# Patient Record
Sex: MALE | Race: WHITE | NOT HISPANIC OR LATINO | Employment: UNEMPLOYED | URBAN - METROPOLITAN AREA
[De-identification: names, ages, dates, MRNs, and addresses within clinical notes are randomized per-mention and may not be internally consistent; named-entity substitution may affect disease eponyms.]

---
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
## 2024-03-12 ENCOUNTER — TELEPHONE (OUTPATIENT)
Dept: FAMILY MEDICINE | Facility: CLINIC | Age: 19
End: 2024-03-12
Payer: COMMERCIAL

## 2024-03-12 NOTE — TELEPHONE ENCOUNTER
"Pt's mother called concerned because [facility] let them know that [facility] was \"giving pushback\" regarding the referral. Advised no C2C on file. Did advise that note would be placed requesting the referral.     Karyn Rodriguez     "

## 2024-03-12 NOTE — TELEPHONE ENCOUNTER
Received a call from Patric from [facility] [phone].    They are doing left shoulder surgery on 4/2 at [facility].  They were told he needs a blue [facility] referral for having the surgery at the [facility].  His insurance is an HMO.        A referral needs to go to [facility].  He needs a referral for doing it at [facility].  This is for the surgery center, not the [facility] provider.      Patric was wondering about the Avail[facility] site for referrals.  He will not have coverage if he does not get a referral.      Asked if they could do it at another site that is covered.  She said then he would need another doctor.      Advised I will forward this to Henrietta Tipton who he is seeing for the pre-op.  She said the referral has to come from his primary care doctor.      Patric said that Henrietta can call her with questions.

## 2024-03-13 NOTE — TELEPHONE ENCOUNTER
As we are finding with our current situation when a provider leaves a location there are to be covering providers to handle items for patients until they are seen. This pt is scheduled to see Henrietta Tipton on 3/20/2024 for a Pre-Op but with my previous note, insurance is requiring PCP to complete. Last completed visit was with Reyna Sosa therefore routing back to [facility] to have covering provider complete.     Karyn Rodriguez

## 2024-03-14 NOTE — TELEPHONE ENCOUNTER
Please call Patric at [facility]. I don't know how to put in a referral for a surgery center. Also, he no longer has a primary care provider as she doesn't work here anymore. What exactly is needed?     Gael Kc PA-C on 3/14/2024 at 10:32 AM (covering for Reyna Sosa)

## 2024-03-14 NOTE — TELEPHONE ENCOUNTER
I called Patric from [facility] @[phone]. She stats pt need referral from a PCP. Unfortunately pt doesn't have a PCP here at , also pt wants to est care at the  clinic. Perhaps pt can have a phone visit before Pre Op and est care? Will forward to the  clinic.    Thank you,    Areli Rivera [facility]

## 2024-03-14 NOTE — TELEPHONE ENCOUNTER
Pt is scheduled to see VO for a Pr-op visit on: 03/20/24    VO will address the concern below at the time of the Pt's appt.     Reyna Rodriguez

## 2024-03-20 ENCOUNTER — OFFICE VISIT (OUTPATIENT)
Dept: FAMILY MEDICINE | Facility: CLINIC | Age: 19
End: 2024-03-20
Payer: COMMERCIAL

## 2024-03-20 VITALS
DIASTOLIC BLOOD PRESSURE: 81 MMHG | TEMPERATURE: 98 F | WEIGHT: 164.5 LBS | RESPIRATION RATE: 25 BRPM | OXYGEN SATURATION: 98 % | HEART RATE: 68 BPM | SYSTOLIC BLOOD PRESSURE: 128 MMHG | BODY MASS INDEX: 23.94 KG/M2

## 2024-03-20 DIAGNOSIS — Z01.818 PREOP GENERAL PHYSICAL EXAM: Primary | ICD-10-CM

## 2024-03-20 DIAGNOSIS — M25.312 SHOULDER INSTABILITY, LEFT: ICD-10-CM

## 2024-03-20 PROCEDURE — 99213 OFFICE O/P EST LOW 20 MIN: CPT | Performed by: NURSE PRACTITIONER

## 2024-03-20 PROCEDURE — 36415 COLL VENOUS BLD VENIPUNCTURE: CPT | Performed by: NURSE PRACTITIONER

## 2024-03-20 PROCEDURE — 85018 HEMOGLOBIN: CPT | Performed by: NURSE PRACTITIONER

## 2024-03-20 ASSESSMENT — PAIN SCALES - GENERAL: PAINLEVEL: NO PAIN (0)

## 2024-03-20 NOTE — PATIENT INSTRUCTIONS
Preparing for Your Surgery  Getting started  A nurse will call you to review your health history and instructions. They will give you an arrival time based on your scheduled surgery time. Please be ready to share:  Your doctor's clinic name and phone number  Your medical, surgical, and anesthesia history  A list of allergies and sensitivities  A list of medicines, including herbal treatments and over-the-counter drugs  Whether the patient has a legal guardian (ask how to send us the papers in advance)  Please tell us if you're pregnant--or if there's any chance you might be pregnant. Some surgeries may injure a fetus (unborn baby), so they require a pregnancy test. Surgeries that are safe for a fetus don't always need a test, and you can choose whether to have one.   If you have a child who's having surgery, please ask for a copy of Preparing for Your Child's Surgery.    Preparing for surgery  Within 10 to 30 days of surgery: Have a pre-op exam (sometimes called an H&P, or History and Physical). This can be done at a clinic or pre-operative center.  If you're having a , you may not need this exam. Talk to your care team.  At your pre-op exam, talk to your care team about all medicines you take. If you need to stop any medicines before surgery, ask when to start taking them again.  We do this for your safety. Many medicines can make you bleed too much during surgery. Some change how well surgery (anesthesia) drugs work.  Call your insurance company to let them know you're having surgery. (If you don't have insurance, call [phone].)  Call your clinic if there's any change in your health. This includes signs of a cold or flu (sore throat, runny nose, cough, rash, fever). It also includes a scrape or scratch near the surgery site.  If you have questions on the day of surgery, call your hospital or surgery center.  Eating and drinking guidelines  For your safety: Unless your surgeon tells you otherwise,  follow the guidelines below.  Eat and drink as usual until 8 hours before you arrive for surgery. After that, no food or milk.  Drink clear liquids until 2 hours before you arrive. These are liquids you can see through, like water, Gatorade, and Propel Water. They also include plain black coffee and tea (no cream or milk), candy, and breath mints. You can spit out gum when you arrive.  If you drink alcohol: Stop drinking it the night before surgery.  If your care team tells you to take medicine on the morning of surgery, it's okay to take it with a sip of water.  Preventing infection  Shower or bathe the night before and morning of your surgery. Follow the instructions your clinic gave you. (If no instructions, use regular soap.)  Don't shave or clip hair near your surgery site. We'll remove the hair if needed.  Don't smoke or vape the morning of surgery. You may chew nicotine gum up to 2 hours before surgery. A nicotine patch is okay.  Note: Some surgeries require you to completely quit smoking and nicotine. Check with your surgeon.  Your care team will make every effort to keep you safe from infection. We will:  Clean our hands often with soap and water (or an alcohol-based hand rub).  Clean the skin at your surgery site with a special soap that kills germs.  Give you a special gown to keep you warm. (Cold raises the risk of infection.)  Wear special hair covers, masks, gowns and gloves during surgery.  Give antibiotic medicine, if prescribed. Not all surgeries need antibiotics.  What to bring on the day of surgery  Photo ID and insurance card  Copy of your health care directive, if you have one  Glasses and hearing aids (bring cases)  You can't wear contacts during surgery  Inhaler and eye drops, if you use them (tell us about these when you arrive)  CPAP machine or breathing device, if you use them  A few personal items, if spending the night  If you have . . .  A pacemaker, ICD (cardiac defibrillator) or other  implant: Bring the ID card.  An implanted stimulator: Bring the remote control.  A legal guardian: Bring a copy of the certified (court-stamped) guardianship papers.  Please remove any jewelry, including body piercings. Leave jewelry and other valuables at home.  If you're going home the day of surgery  You must have a responsible adult drive you home. They should stay with you overnight as well.  If you don't have someone to stay with you, and you aren't safe to go home alone, we may keep you overnight. Insurance often won't pay for this.  After surgery  If it's hard to control your pain or you need more pain medicine, please call your surgeon's office.  Questions?   If you have any questions for your care team, list them here: _________________________________________________________________________________________________________________________________________________________________________ ____________________________________ ____________________________________ ____________________________________  For informational purposes only. Not to replace the advice of your health care provider. Copyright   2003, 2019 [employer]. All rights reserved. Clinically reviewed by Basia Maki MD. SMARTworks 757797 - REV 12/22.    How to Take Your Medication Before Surgery  - You are currently not on any medications, please don't start supplements or use anti-inflammatories  before surgery    Preparing for Your Surgery  Getting started  A nurse will call you to review your health history and instructions. They will give you an arrival time based on your scheduled surgery time. Please be ready to share:  Your doctor's clinic name and phone number  Your medical, surgical, and anesthesia history  A list of allergies and sensitivities  A list of medicines, including herbal treatments and over-the-counter drugs  Whether the patient has a legal guardian (ask how to send us the papers in advance)  Please tell us if  you're pregnant--or if there's any chance you might be pregnant. Some surgeries may injure a fetus (unborn baby), so they require a pregnancy test. Surgeries that are safe for a fetus don't always need a test, and you can choose whether to have one.   If you have a child who's having surgery, please ask for a copy of Preparing for Your Child's Surgery.    Preparing for surgery  Within 10 to 30 days of surgery: Have a pre-op exam (sometimes called an H&P, or History and Physical). This can be done at a clinic or pre-operative center.  If you're having a , you may not need this exam. Talk to your care team.  At your pre-op exam, talk to your care team about all medicines you take. If you need to stop any medicines before surgery, ask when to start taking them again.  We do this for your safety. Many medicines can make you bleed too much during surgery. Some change how well surgery (anesthesia) drugs work.  Call your insurance company to let them know you're having surgery. (If you don't have insurance, call [phone].)  Call your clinic if there's any change in your health. This includes signs of a cold or flu (sore throat, runny nose, cough, rash, fever). It also includes a scrape or scratch near the surgery site.  If you have questions on the day of surgery, call your hospital or surgery center.  Eating and drinking guidelines  For your safety: Unless your surgeon tells you otherwise, follow the guidelines below.  Eat and drink as usual until 8 hours before you arrive for surgery. After that, no food or milk.  Drink clear liquids until 2 hours before you arrive. These are liquids you can see through, like water, Gatorade, and Propel Water. They also include plain black coffee and tea (no cream or milk), candy, and breath mints. You can spit out gum when you arrive.  If you drink alcohol: Stop drinking it the night before surgery.  If your care team tells you to take medicine on the morning of surgery,  it's okay to take it with a sip of water.  Preventing infection  Shower or bathe the night before and morning of your surgery. Follow the instructions your clinic gave you. (If no instructions, use regular soap.)  Don't shave or clip hair near your surgery site. We'll remove the hair if needed.  Don't smoke or vape the morning of surgery. You may chew nicotine gum up to 2 hours before surgery. A nicotine patch is okay.  Note: Some surgeries require you to completely quit smoking and nicotine. Check with your surgeon.  Your care team will make every effort to keep you safe from infection. We will:  Clean our hands often with soap and water (or an alcohol-based hand rub).  Clean the skin at your surgery site with a special soap that kills germs.  Give you a special gown to keep you warm. (Cold raises the risk of infection.)  Wear special hair covers, masks, gowns and gloves during surgery.  Give antibiotic medicine, if prescribed. Not all surgeries need antibiotics.  What to bring on the day of surgery  Photo ID and insurance card  Copy of your health care directive, if you have one  Glasses and hearing aids (bring cases)  You can't wear contacts during surgery  Inhaler and eye drops, if you use them (tell us about these when you arrive)  CPAP machine or breathing device, if you use them  A few personal items, if spending the night  If you have . . .  A pacemaker, ICD (cardiac defibrillator) or other implant: Bring the ID card.  An implanted stimulator: Bring the remote control.  A legal guardian: Bring a copy of the certified (court-stamped) guardianship papers.  Please remove any jewelry, including body piercings. Leave jewelry and other valuables at home.  If you're going home the day of surgery  You must have a responsible adult drive you home. They should stay with you overnight as well.  If you don't have someone to stay with you, and you aren't safe to go home alone, we may keep you overnight. Insurance often  won't pay for this.  After surgery  If it's hard to control your pain or you need more pain medicine, please call your surgeon's office.  Questions?   If you have any questions for your care team, list them here: _________________________________________________________________________________________________________________________________________________________________________ ____________________________________ ____________________________________ ____________________________________  For informational purposes only. Not to replace the advice of your health care provider. Copyright   2003, 2019 [facility]. All rights reserved. Clinically reviewed by Basia Maki MD. SMARTworks 337557 - REV 12/22.    How to Take Your Medication Before Surgery  On no medications.  Do not start OTC supplements or use NSAIDS before surgery

## 2024-03-20 NOTE — PROGRESS NOTES
Preoperative Evaluation  [facility]  [address] [address] [address]  Phone: [phone]  Primary Provider: Pediatrics Glenn Zavala  Pre-op Performing Provider: RUKHSANA MARVIN 20, 2024       Sd is a 18 year old, presenting for the following:  Pre-Op Exam        3/20/2024     8:37 AM   Additional Questions   Roomed by Marcelina AVILEZ MA   Accompanied by Chucky Mccarthy         3/20/2024     8:37 AM   Patient Reported Additional Medications   Patient reports taking the following new medications None     Surgical Information  Surgery/Procedure: Left shoulder repair  Surgery Location: [facility]  Surgeon: Avery Mckeon  Surgery Date: 4/2/2024  Time of Surgery: TBD  Where patient plans to recover: At home with family  Fax number for surgical facility: fax to [phone]     Assessment & Plan     The proposed surgical procedure is considered INTERMEDIATE risk.      ICD-10-CM    1. Preop general physical exam  Z01.818 Hemoglobin     Hemoglobin      2. Shoulder instability, left  M25.312 Orthopedic  Referral              - No identified additional risk factors other than previously addressed    Antiplatelet or Anticoagulation Medication Instructions   - Patient is on no antiplatelet or anticoagulation medications.    Additional Medication Instructions  Patient is on no additional chronic medications    Recommendation  APPROVAL GIVEN to proceed with proposed procedure, without further diagnostic evaluation.          Subjective       HPI related to upcoming procedure:   Shoulder instability, left  Experiences dislocation with activities of daily living.  Surgical treatment is planned.          3/20/2024     8:17 AM   Preop Questions   1. Have you ever had a heart attack or stroke? No   2. Have you ever had surgery on your heart or blood vessels, such as a stent placement, a coronary artery bypass, or surgery on an artery in your head, neck, heart, or legs? No   3. Do you  have chest pain with activity? No   4. Do you have a history of  heart failure? No   5. Do you currently have a cold, bronchitis or symptoms of other infection? No   6. Do you have a cough, shortness of breath, or wheezing? No   7. Do you or anyone in your family have previous history of blood clots? No   8. Do you or does anyone in your family have a serious bleeding problem such as prolonged bleeding following surgeries or cuts? No   9. Have you ever had problems with anemia or been told to take iron pills? No   10. Have you had any abnormal blood loss such as black, tarry or bloody stools? No   11. Have you ever had a blood transfusion? No   12. Are you willing to have a blood transfusion if it is medically needed before, during, or after your surgery? Yes   13. Have you or any of your relatives ever had problems with anesthesia? No   14. Do you have sleep apnea, excessive snoring or daytime drowsiness? No   15. Do you have any artifical heart valves or other implanted medical devices like a pacemaker, defibrillator, or continuous glucose monitor? No   16. Do you have artificial joints? No   17. Are you allergic to latex? No       Health Care Directive  Patient does not have a Health Care Directive or Living Will: Discussed advance care planning with patient; however, patient declined at this time.    Preoperative Review of    reviewed - previous opioids for surgical procedures as expected          Patient Active Problem List    Diagnosis Date Noted    Shoulder instability, left 03/20/2024     Priority: Medium    Mandible fracture (H) 01/13/2022     Priority: Medium      No past medical history on file.  Past Surgical History:   Procedure Laterality Date    OPEN REDUCTION INTERNAL FIXATION MANDIBLE Left 1/13/2022    Procedure: OPEN REDUCTION INTERNAL FIXATION, FRACTURE, LEFT    MANDIBLE;  Surgeon: Dago Elizondo DDS;  Location:  OR    ORTHOPEDIC SURGERY      broken finger, pin placed    TONSILLECTOMY,  "ADENOIDECTOMY, COMBINED       No current outpatient medications on file.       No Known Allergies     Social History     Tobacco Use    Smoking status: Never     Passive exposure: Never    Smokeless tobacco: Never   Substance Use Topics    Alcohol use: No     Alcohol/week: 0.0 standard drinks of alcohol       History   Drug Use No         Review of Systems    Review of Systems  CONSTITUTIONAL: NEGATIVE for fever, chills, change in weight  INTEGUMENTARY/SKIN: NEGATIVE for worrisome rashes, moles or lesions  EYES: NEGATIVE for vision changes or irritation  ENT/MOUTH: NEGATIVE for ear, mouth and throat problems  RESP: NEGATIVE for significant cough or SOB  CV: NEGATIVE for chest pain, palpitations or peripheral edema  GI: NEGATIVE for nausea, abdominal pain, heartburn, or change in bowel habits  : NEGATIVE for frequency, dysuria, or hematuria  NEURO: NEGATIVE for weakness, dizziness or paresthesias  ENDOCRINE: NEGATIVE for temperature intolerance, skin/hair changes  HEME: NEGATIVE for bleeding problems  PSYCHIATRIC: NEGATIVE for changes in mood or affect    Objective    /81 (BP Location: Right arm, Patient Position: Sitting, Cuff Size: Adult Regular)   Pulse 68   Temp 98  F (36.7  C) (Oral)   Resp 25   Wt 74.6 kg (164 lb 8 oz)   SpO2 98%   BMI 23.94 kg/m     Estimated body mass index is 23.94 kg/m  as calculated from the following:    Height as of 5/2/23: 1.765 m (5' 9.5\").    Weight as of this encounter: 74.6 kg (164 lb 8 oz).  Physical Exam  GENERAL: alert and no distress  EYES: Eyes grossly normal to inspection, PERRL and conjunctivae and sclerae normal  HENT: ear canals and TM's normal, nose and mouth without ulcers or lesions  NECK: no adenopathy, no asymmetry, masses, or scars  RESP: lungs clear to auscultation - no rales, rhonchi or wheezes  CV: regular rate and rhythm, normal S1 S2, no S3 or S4, no murmur, click or rub, no peripheral edema  ABDOMEN: soft, nontender, no hepatosplenomegaly, no " masses and bowel sounds normal  MS: no gross musculoskeletal defects noted, no edema  SKIN: no suspicious lesions or rashes  NEURO: Normal strength and tone, mentation intact and speech normal  PSYCH: mentation appears normal, affect normal/bright    Recent Labs   Lab Test 05/02/23  0823   HGB 14.0         POTASSIUM 4.3   CR 1.07        Diagnostics  Hemoglobin   Date Value Ref Range Status   03/20/2024 14.6 13.3 - 17.7 g/dL Final   08/06/2016 12.1 11.7 - 15.7 g/dL Final   ]  No EKG required, no history of coronary heart disease, significant arrhythmia, peripheral arterial disease or other structural heart disease.    Revised Cardiac Risk Index (RCRI)  The patient has the following serious cardiovascular risks for perioperative complications:   - No serious cardiac risks = 0 points     RCRI Interpretation: 0 points: Class I (very low risk - 0.4% complication rate)         Signed Electronically by: YASMANI Marrufo CNP  Copy of this evaluation report is provided to requesting physician.

## 2024-03-21 LAB — HGB BLD-MCNC: 14.6 G/DL (ref 13.3–17.7)

## 2024-03-22 ENCOUNTER — OFFICE VISIT (OUTPATIENT)
Dept: URGENT CARE | Facility: URGENT CARE | Age: 19
End: 2024-03-22
Payer: COMMERCIAL

## 2024-03-22 ENCOUNTER — ANCILLARY PROCEDURE (OUTPATIENT)
Dept: GENERAL RADIOLOGY | Facility: CLINIC | Age: 19
End: 2024-03-22
Attending: FAMILY MEDICINE
Payer: COMMERCIAL

## 2024-03-22 VITALS
TEMPERATURE: 97.6 F | SYSTOLIC BLOOD PRESSURE: 133 MMHG | RESPIRATION RATE: 16 BRPM | HEART RATE: 65 BPM | DIASTOLIC BLOOD PRESSURE: 81 MMHG | OXYGEN SATURATION: 99 %

## 2024-03-22 DIAGNOSIS — S90.411A ABRASION, RIGHT GREAT TOE, INITIAL ENCOUNTER: ICD-10-CM

## 2024-03-22 DIAGNOSIS — M79.674 PAIN OF TOE OF RIGHT FOOT: Primary | ICD-10-CM

## 2024-03-22 DIAGNOSIS — M79.674 PAIN OF TOE OF RIGHT FOOT: ICD-10-CM

## 2024-03-22 PROCEDURE — 73660 X-RAY EXAM OF TOE(S): CPT | Mod: TC | Performed by: RADIOLOGY

## 2024-03-22 PROCEDURE — 99213 OFFICE O/P EST LOW 20 MIN: CPT | Performed by: FAMILY MEDICINE

## 2024-03-22 NOTE — PROGRESS NOTES
SUBJECTIVE:  Chief Complaint   Patient presents with    Toe Injury     Yesterday, right big toe and 2nd toe, bottom of chair smashed into toes, brusied, painful to walk     Juan Montana is a 19 year old male presents with a chief complaint of traumatic pain and bruising at the right big toe and right second toe.  The injury occurred one day ago.   The injury happened while at a friend's house. How: The patient was sitting on a chair and leaned back.  The bottom of a chair hit the right big toe and the right second toe.  . .  Pain exacerbated by walking, wearing closed-toe shoes, touching the painful areas.  Relieved by keeping off the right foot.  .  He treated it initially with Band-Aid, ice. . This is the first time this type of injury has occurred to this patient.     Past Medical History:    No major medical problems.     No current outpatient medications on file.     Social History     Tobacco Use    Smoking status: Never     Passive exposure: Never    Smokeless tobacco: Never   Substance Use Topics    Alcohol use: No     Alcohol/week: 0.0 standard drinks of alcohol       ROS:  CONSTITUTIONAL:negative for fevers.    MUSCULOSKELETAL:  positive for pain at the right great toe and right second toe.      EXAM:   /81   Pulse 65   Temp 97.6  F (36.4  C)   Resp 16   SpO2 99%   Gen: healthy,alert,no distress  Extremity:   Right great toe:  there is a linear-shaped horizontal abrasion on the dorsal surface of the distal phalanx area (proximal to the nail).  No active bleeding is present.  No subungual hematomas.  There is some tenderness with palpation over the right distal phalanx region    Right second toe has a small subungual hematoma present.  There is no active bleeding.  There is pain with palpation over the distal phalanx area.      X-RAY was done.  I viewed all X-ray images during this clinic encounter.    X-rays of the toes of the right foot showed possible radiolucency at the distal aspect  of the distal phalanx of the right second toe (best seen on the lateral view).  The right great toe had no fractures seen on the X-rays.  Note:  The radiology report did not find a fracture on any of the toes of the right foot.      ASSESSMENT:   Pain at the right great toe and right second toe. The radiology report found no fractures at the affected toes.      Abrasion on the right great toe.      PLAN:  For the abrasion:  Continue applying Bacitracin ointment and keep covered.  Change the dressing once daily until a scab forms.    Follow up if any spreading redness, if lots of pus or if fevers appear.      For the toe pain and for the possible right second toe distal phalanx fracture:  D a isaac Splint of the right second toe to the right third toe for the next 2 weeks.    Place ice onto the painful areas of the toes.    Tylenol, Ibuprofen for the pain.        Jaylan Cohen MD

## 2024-03-22 NOTE — PATIENT INSTRUCTIONS
Continue applying Bacitracin onto the scraped area of the right big toe once a day.  Cover that area with a Band-Aid.  Change the Band-Aid once a day.  You can stop the Bacitracin and Band-Aid once a scab forms.      Place ice onto the painful areas of the toes for 15 minutes at a time, every 2 hours while awake.      Follow up if not better in 2 weeks.

## 2024-11-10 ENCOUNTER — HEALTH MAINTENANCE LETTER (OUTPATIENT)
Age: 19
End: 2024-11-10

## 2024-12-23 ENCOUNTER — OFFICE VISIT (OUTPATIENT)
Dept: URGENT CARE | Facility: URGENT CARE | Age: 19
End: 2024-12-23
Payer: COMMERCIAL

## 2024-12-23 VITALS
WEIGHT: 167 LBS | DIASTOLIC BLOOD PRESSURE: 83 MMHG | BODY MASS INDEX: 24.31 KG/M2 | OXYGEN SATURATION: 98 % | TEMPERATURE: 96.9 F | RESPIRATION RATE: 18 BRPM | HEART RATE: 69 BPM | SYSTOLIC BLOOD PRESSURE: 129 MMHG

## 2024-12-23 DIAGNOSIS — J22 LOWER RESP. TRACT INFECTION: Primary | ICD-10-CM

## 2024-12-23 PROCEDURE — 99213 OFFICE O/P EST LOW 20 MIN: CPT | Performed by: PHYSICIAN ASSISTANT

## 2024-12-23 RX ORDER — PREDNISONE 20 MG/1
40 TABLET ORAL DAILY
Qty: 10 TABLET | Refills: 0 | Status: SHIPPED | OUTPATIENT
Start: 2024-12-23 | End: 2024-12-28

## 2024-12-23 RX ORDER — PSEUDOEPHED/ACETAMINOPH/DIPHEN 30MG-500MG
500-1000 TABLET ORAL EVERY 6 HOURS PRN
COMMUNITY
Start: 2024-04-01

## 2024-12-23 NOTE — PROGRESS NOTES
SUBJECTIVE:  Juan Montana is a 19 year old male who comes in with approximately 2-week history of cough.  Initially cough was dry but becoming more productive.  He did not feel short of breath at rest but has noticed that at hockey he is feeling more winded.  Has been using his albuterol inhaler.  Did have asthma more as a child and seems to be outgrowing it.  Cold air and activity seem to be affecting the cough.  Does have some mild sinus congestion along with pain for the last 4 to 5 days.  No fevers have been noted.  Has been using over-the-counter med for symptomatic relief.  He is otherwise at baseline health.      No past medical history on file.  Patient Active Problem List   Diagnosis    Mandible fracture (H)    Shoulder instability, left     Current Outpatient Medications   Medication Sig Dispense Refill    acetaminophen (TYLENOL) 500 MG tablet Take 500-1,000 mg by mouth every 6 hours as needed.       No current facility-administered medications for this visit.     Social History     Socioeconomic History    Marital status: Single     Spouse name: Not on file    Number of children: Not on file    Years of education: Not on file    Highest education level: Not on file   Occupational History    Not on file   Tobacco Use    Smoking status: Never     Passive exposure: Never    Smokeless tobacco: Never   Vaping Use    Vaping status: Never Used   Substance and Sexual Activity    Alcohol use: No     Alcohol/week: 0.0 standard drinks of alcohol    Drug use: No    Sexual activity: Never   Other Topics Concern    Not on file   Social History Narrative    Not on file     Social Drivers of Health     Financial Resource Strain: Low Risk  (5/2/2023)    Overall Financial Resource Strain (CARDIA)     Difficulty of Paying Living Expenses: Not hard at all   Food Insecurity: No Food Insecurity (5/2/2023)    Hunger Vital Sign     Worried About Running Out of Food in the Last Year: Never true     Ran Out of Food in  the Last Year: Never true   Transportation Needs: No Transportation Needs (5/2/2023)    PRAPARE - Transportation     Lack of Transportation (Medical): No     Lack of Transportation (Non-Medical): No   Physical Activity: Sufficiently Active (5/2/2023)    Exercise Vital Sign     Days of Exercise per Week: 7 days     Minutes of Exercise per Session: 120 min   Stress: No Stress Concern Present (5/2/2023)    [employer] [place] [employer] - Occupational Stress Questionnaire     Feeling of Stress : Only a little   Social Connections: Unknown (5/2/2023)    Social Connection and Isolation Panel [NHANES]     Frequency of Communication with Friends and Family: Three times a week     Frequency of Social Gatherings with Friends and Family: More than three times a week     Attends Yarsanism Services: Never     Active Member of Clubs or Organizations: Yes     Attends Club or Organization Meetings: Not on file     Marital Status: Patient declined   Interpersonal Safety: Not on file   Housing Stability: High Risk (5/2/2023)    Housing Stability Vital Sign     Unable to Pay for Housing in the Last Year: No     Number of Places Lived in the Last Year: 3     Unstable Housing in the Last Year: No     ROS negative other than stated above    Exam:  GENERAL APPEARANCE: healthy, alert and no distress  EYES: EOMI,  PERRL  HENT: ear canals and TM's normal and nose and mouth without ulcers or lesions  RESP: Patient with coarse rhonchi scattered throughout.  Tight lung sounds in general.  CV: regular rates and rhythm, normal S1 S2, no S3 or S4 and no murmur, click or rub -  SKIN: no suspicious lesions or rashes    assessment/plan:  (J22) Lower resp. tract infection  (primary encounter diagnosis)  Comment:   Plan: amoxicillin-clavulanate (AUGMENTIN) 875-125 MG         tablet, predniSONE (DELTASONE) 20 MG tablet          Patient with a 2-week history of URI related symptoms primarily in the chest.  Vitals are reassuring is not any  fevers.  Does have abnormal lung sounds.  Offered chest x-ray and declines and will just treat.  Augmentin as directed along with short burst of prednisone.  Declines need for an inhaler.  Will continue with over-the-counter med for symptomatic relief.  Red flag signs were discussed will follow-up as needed

## 2025-02-14 ENCOUNTER — TELEPHONE (OUTPATIENT)
Dept: FAMILY MEDICINE | Facility: CLINIC | Age: 20
End: 2025-02-14

## 2025-02-14 NOTE — CONFIDENTIAL NOTE
Patient Quality Outreach    Patient is due for the following:   Physical Preventive Adult Physical    Action(s) Taken:   Schedule a Adult Preventative    Type of outreach:    Sent A[employer] message.    Questions for provider review:    None           Collin Arnold MA

## 2025-02-14 NOTE — LETTER
[facility]  [address] [address] [phone]       February 28, 2025    Juan Montana  [address] [facility] [address]    Dear Sd,    We care about your health and have reviewed your health plan and are making recommendations based on this review, to optimize your health.    You are in particular need of attention regarding:  -Wellness (Physical) Visit     We are recommending that you:  -schedule a WELLNESS (Physical) APPOINTMENT with me.   I will check fasting labs the same day - nothing to eat except water and meds for 8-10 hours prior.    In addition, here is a list of due or overdue Health Maintenance reminders.    Health Maintenance Due   Topic Date Due    HIV Screening  Never done    Yearly Preventive Visit  03/05/2020    Meningitis B Vaccine (1 of 2 - Standard) Never done    Hepatitis C Screening  Never done    ANNUAL REVIEW OF HM ORDERS  05/02/2024    Flu Vaccine (1) 09/01/2024    COVID-19 Vaccine (4 - 2024-25 season) 09/01/2024    PHQ-2 (once per calendar year)  01/01/2025       To address the above recommendations, we encourage you to contact us at [phone], via [employer] or by contacting Central Scheduling toll free at [phone] 24 hours a day. They will assist you with finding the most convenient time and location.    Thank you for trusting [facility] and we appreciate the opportunity to serve you.  We look forward to supporting your healthcare needs in the future.    Healthy Regards,    Your [facility] Team

## 2025-02-14 NOTE — LETTER
[facility]  [address] [address] [phone]       May 29, 2025    Juan Montana  [address]  [place] [address]    Dear Sd,    We care about your health and have reviewed your health plan and are making recommendations based on this review, to optimize your health.    You are in particular need of attention regarding:  -Wellness (Physical) Visit     We are recommending that you:  -schedule a WELLNESS (Physical) APPOINTMENT with me.   I will check fasting labs the same day - nothing to eat except water and meds for 8-10 hours prior.    In addition, here is a list of due or overdue Health Maintenance reminders.    Health Maintenance Due   Topic Date Due    HIV Screening  Never done    Yearly Preventive Visit  03/05/2020    Meningitis B Vaccine (1 of 2 - Standard) Never done    Hepatitis C Screening  Never done    ANNUAL REVIEW OF HM ORDERS  05/02/2024    COVID-19 Vaccine (4 - 2024-25 season) 09/01/2024    PHQ-2 (once per calendar year)  01/01/2025       To address the above recommendations, we encourage you to contact us at [phone], via [employer] or by contacting Central Scheduling toll free at [phone] 24 hours a day. They will assist you with finding the most convenient time and location.    Thank you for trusting [facility] and we appreciate the opportunity to serve you.  We look forward to supporting your healthcare needs in the future.    Healthy Regards,    Your [facility] Team

## 2025-02-28 NOTE — CONFIDENTIAL NOTE
Patient Quality Outreach    Patient is due for the following:   Physical Preventive Adult Physical    Action(s) Taken:   Schedule a Adult Preventative    Type of outreach:    Sent letter.    Questions for provider review:    None           Collin Arnold MA

## 2025-05-29 NOTE — CONFIDENTIAL NOTE
Patient Quality Outreach    Patient is due for the following:   Physical Preventive Adult Physical    Action(s) Taken:   Schedule a Adult Preventative    Type of outreach:    Sent letter.    Questions for provider review:    None         Collin Arnold MA  Chart routed to .

## 2025-07-22 ENCOUNTER — MYC MEDICAL ADVICE (OUTPATIENT)
Dept: FAMILY MEDICINE | Facility: CLINIC | Age: 20
End: 2025-07-22
Payer: COMMERCIAL

## 2025-07-22 NOTE — TELEPHONE ENCOUNTER
Patient has not seen Lila Tipton since 3/20/24.    Recommend e-visit for referral to [facility].    Tawnya Jon RN   [facility]

## 2025-07-22 NOTE — TELEPHONE ENCOUNTER
[facility] referral was sent on 3/20/24. This has now .     [facility] message sent for clarification.    Tawnya Jon RN   [facility]

## 2025-07-23 NOTE — TELEPHONE ENCOUNTER
This is for a referral.needed. Patient is having another surgery on the same shoulder torn as last year.  Please call Shari Montana is on a Authorization to Share Protected Health Information. Please call Shari so this can be straightened out at cell # [phone]. If no answer it's ok to leav a Voice Mail message

## 2025-07-23 NOTE — TELEPHONE ENCOUNTER
[facility] referral will need the following:    NPI for Dorinda PRESTON:  [id number]    NPI for Dr Alec Mckeon: [id number]    Advised pt's mom that referral can be requested at pre-op appt on 25.    Megan Cole RN, BSN  [facility]

## 2025-07-30 ENCOUNTER — PATIENT OUTREACH (OUTPATIENT)
Dept: CARE COORDINATION | Facility: CLINIC | Age: 20
End: 2025-07-30
Payer: COMMERCIAL

## 2025-08-04 ENCOUNTER — TELEPHONE (OUTPATIENT)
Dept: FAMILY MEDICINE | Facility: CLINIC | Age: 20
End: 2025-08-04
Payer: COMMERCIAL

## (undated) DEVICE — PACKING NASAL RHINO 8CM

## (undated) DEVICE — SPONGE PACK VAGINAL 2"X9

## (undated) DEVICE — DRILL BIT SYN 1.5X44.5 W/6MM STOP J LATCH 317.66

## (undated) DEVICE — BAG CLEAR TRASH 1.3M 39X33" P4040C

## (undated) DEVICE — SUCTION CANISTER MEDIVAC LINER 3000ML W/LID 65651-530

## (undated) DEVICE — Device

## (undated) DEVICE — PACKING NASAL DOYLE AIRWAY

## (undated) DEVICE — LINEN HALF SHEET 5512

## (undated) DEVICE — PACK ENT PLASTICS RIDGES

## (undated) DEVICE — SYR EAR BULB 3OZ 0035830

## (undated) DEVICE — LINEN TOWEL PACK X10 5473

## (undated) DEVICE — SU CHROMIC 3-0 PS-2 27" 1638H

## (undated) DEVICE — ESU PENCIL W/HOLSTER E2350H

## (undated) DEVICE — ESU GROUND PAD ADULT W/CORD E7507

## (undated) DEVICE — GLOVE PROTEXIS POWDER FREE SMT 7.5  2D72PT75X

## (undated) DEVICE — SOL WATER IRRIG 1000ML BOTTLE 2F7114

## (undated) DEVICE — ESU ELEC NDL 1" COATED/INSULATED E1465

## (undated) DEVICE — BUR OVAL 4X48MM CARBIDE  03-C0901

## (undated) DEVICE — LINEN FULL SHEET 5511

## (undated) DEVICE — GLOVE PROTEXIS POWDER FREE 8.0 ORTHOPEDIC 2D73ET80

## (undated) RX ORDER — ONDANSETRON 2 MG/ML
INJECTION INTRAMUSCULAR; INTRAVENOUS
Status: DISPENSED
Start: 2022-01-13

## (undated) RX ORDER — LIDOCAINE HYDROCHLORIDE 10 MG/ML
INJECTION, SOLUTION EPIDURAL; INFILTRATION; INTRACAUDAL; PERINEURAL
Status: DISPENSED
Start: 2022-01-13

## (undated) RX ORDER — CHLORHEXIDINE GLUCONATE ORAL RINSE 1.2 MG/ML
SOLUTION DENTAL
Status: DISPENSED
Start: 2022-01-13

## (undated) RX ORDER — LIDOCAINE HYDROCHLORIDE AND EPINEPHRINE BITARTRATE 20; .01 MG/ML; MG/ML
INJECTION, SOLUTION SUBCUTANEOUS
Status: DISPENSED
Start: 2022-01-13

## (undated) RX ORDER — CEFAZOLIN SODIUM/WATER 2 G/20 ML
SYRINGE (ML) INTRAVENOUS
Status: DISPENSED
Start: 2022-01-13

## (undated) RX ORDER — FENTANYL CITRATE 50 UG/ML
INJECTION, SOLUTION INTRAMUSCULAR; INTRAVENOUS
Status: DISPENSED
Start: 2022-01-13

## (undated) RX ORDER — NEOSTIGMINE METHYLSULFATE 1 MG/ML
VIAL (ML) INJECTION
Status: DISPENSED
Start: 2022-01-13

## (undated) RX ORDER — GINSENG 100 MG
CAPSULE ORAL
Status: DISPENSED
Start: 2022-01-13

## (undated) RX ORDER — DEXAMETHASONE SODIUM PHOSPHATE 4 MG/ML
INJECTION, SOLUTION INTRA-ARTICULAR; INTRALESIONAL; INTRAMUSCULAR; INTRAVENOUS; SOFT TISSUE
Status: DISPENSED
Start: 2022-01-13

## (undated) RX ORDER — GLYCOPYRROLATE 0.2 MG/ML
INJECTION INTRAMUSCULAR; INTRAVENOUS
Status: DISPENSED
Start: 2022-01-13

## (undated) RX ORDER — PROPOFOL 10 MG/ML
INJECTION, EMULSION INTRAVENOUS
Status: DISPENSED
Start: 2022-01-13